# Patient Record
Sex: FEMALE | Race: BLACK OR AFRICAN AMERICAN | NOT HISPANIC OR LATINO | Employment: UNEMPLOYED | URBAN - METROPOLITAN AREA
[De-identification: names, ages, dates, MRNs, and addresses within clinical notes are randomized per-mention and may not be internally consistent; named-entity substitution may affect disease eponyms.]

---

## 2021-10-11 ENCOUNTER — HOSPITAL ENCOUNTER (EMERGENCY)
Facility: OTHER | Age: 26
Discharge: HOME | End: 2021-10-11
Attending: EMERGENCY MEDICINE
Payer: MEDICAID

## 2021-10-11 VITALS
HEIGHT: 60 IN | DIASTOLIC BLOOD PRESSURE: 83 MMHG | TEMPERATURE: 98 F | WEIGHT: 140 LBS | OXYGEN SATURATION: 98 % | SYSTOLIC BLOOD PRESSURE: 159 MMHG | HEART RATE: 79 BPM | BODY MASS INDEX: 27.48 KG/M2 | RESPIRATION RATE: 19 BRPM

## 2021-10-11 DIAGNOSIS — Z53.21 ELOPED FROM EMERGENCY DEPARTMENT: Primary | ICD-10-CM

## 2021-10-11 DIAGNOSIS — H57.12 LEFT EYE PAIN: ICD-10-CM

## 2021-10-11 PROCEDURE — 99282 EMERGENCY DEPT VISIT SF MDM: CPT | Mod: 25

## 2021-10-11 PROCEDURE — 25000003 PHARM REV CODE 250: Performed by: NURSE PRACTITIONER

## 2021-10-11 RX ORDER — ERYTHROMYCIN 5 MG/G
OINTMENT OPHTHALMIC
Status: COMPLETED | OUTPATIENT
Start: 2021-10-11 | End: 2021-10-11

## 2021-10-11 RX ORDER — TETRACAINE HYDROCHLORIDE 5 MG/ML
2 SOLUTION OPHTHALMIC
Status: COMPLETED | OUTPATIENT
Start: 2021-10-11 | End: 2021-10-11

## 2021-10-11 RX ADMIN — TETRACAINE HYDROCHLORIDE 2 DROP: 5 SOLUTION OPHTHALMIC at 11:10

## 2021-10-11 RX ADMIN — ERYTHROMYCIN 1 INCH: 5 OINTMENT OPHTHALMIC at 11:10

## 2021-10-11 RX ADMIN — FLUORESCEIN SODIUM 1 EACH: 1 STRIP OPHTHALMIC at 11:10

## 2022-06-21 PROBLEM — O09.93 SUPERVISION OF HIGH RISK PREGNANCY IN THIRD TRIMESTER: Status: ACTIVE | Noted: 2022-06-21

## 2022-06-21 PROBLEM — D57.3 SICKLE CELL TRAIT IN MOTHER AFFECTING PREGNANCY: Status: ACTIVE | Noted: 2022-06-21

## 2022-06-21 PROBLEM — Z98.891 HISTORY OF C-SECTION: Status: ACTIVE | Noted: 2022-06-21

## 2022-06-21 PROBLEM — O99.019 SICKLE CELL TRAIT IN MOTHER AFFECTING PREGNANCY: Status: ACTIVE | Noted: 2022-06-21

## 2022-06-21 PROBLEM — O09.33 INSUFFICIENT PRENATAL CARE IN THIRD TRIMESTER: Status: ACTIVE | Noted: 2022-06-21

## 2022-06-21 PROBLEM — O36.1930 MATERNAL ATYPICAL ANTIBODY COMPLICATING PREGNANCY IN THIRD TRIMESTER: Status: ACTIVE | Noted: 2022-06-21

## 2022-06-21 PROBLEM — O36.5930 POOR FETAL GROWTH AFFECTING MANAGEMENT OF MOTHER IN THIRD TRIMESTER: Status: ACTIVE | Noted: 2022-06-21

## 2022-06-22 ENCOUNTER — DOCUMENTATION ONLY (OUTPATIENT)
Dept: MATERNAL FETAL MEDICINE | Facility: CLINIC | Age: 27
End: 2022-06-22
Payer: MEDICAID

## 2022-06-22 ENCOUNTER — TELEPHONE (OUTPATIENT)
Dept: MATERNAL FETAL MEDICINE | Facility: CLINIC | Age: 27
End: 2022-06-22
Payer: MEDICAID

## 2022-06-22 PROBLEM — O28.3 ABNORMAL FETAL ULTRASOUND: Status: ACTIVE | Noted: 2022-06-22

## 2022-06-22 NOTE — TELEPHONE ENCOUNTER
Message left for pt to call [facility] clinic at [phone] to discuss appointments in [place].

## 2022-06-23 ENCOUNTER — TELEPHONE (OUTPATIENT)
Dept: MATERNAL FETAL MEDICINE | Facility: CLINIC | Age: 27
End: 2022-06-23
Payer: MEDICAID

## 2022-06-23 DIAGNOSIS — O36.5930 POOR FETAL GROWTH AFFECTING MANAGEMENT OF MOTHER IN THIRD TRIMESTER, SINGLE OR UNSPECIFIED FETUS: Primary | ICD-10-CM

## 2022-06-23 DIAGNOSIS — O35.9XX0 SUSPECTED FETAL ANOMALY, ANTEPARTUM, SINGLE OR UNSPECIFIED FETUS: ICD-10-CM

## 2022-06-23 PROBLEM — O35.BXX0: Status: ACTIVE | Noted: 2022-06-23

## 2022-06-23 NOTE — TELEPHONE ENCOUNTER
MFM RN discussed plan for upcoming appointments at [facility] on 6/28/22 starting at 9am for MFM and a fetal echo with consult to follow.     Patient to expect a call from our OB  and patient to call Medicaid transportation to arrange but explained that this needs to be scheduled at least 48 hours in advance.     Mentioned that there is a hotel right next to [facility] but patient will discuss with her family and call back with questions.     Pt verbalized understanding of information.

## 2022-06-24 ENCOUNTER — PATIENT MESSAGE (OUTPATIENT)
Dept: MATERNAL FETAL MEDICINE | Facility: CLINIC | Age: 27
End: 2022-06-24
Payer: MEDICAID

## 2022-06-24 ENCOUNTER — SOCIAL WORK (OUTPATIENT)
Dept: OBSTETRICS AND GYNECOLOGY | Facility: OTHER | Age: 27
End: 2022-06-24
Payer: MEDICAID

## 2022-06-24 NOTE — PROGRESS NOTES
SW contacted pt after consult received. SW role explained and pt verbalized understanding. According to pt, her father will pickup and drop off for scheduled . Pt explained she does not have anywhere to stay while in [place] and baby in NICU. SW educated pt on Medicaid lodging. Pt would like assistance with setting up lodging after the birth of baby.     SW contacted [email or web address] to arrange lodging. SW spoke with Mariusz to arrange lodging and meal reimbursement. ID # for request is [id number]. SW provided pt with id number and Medicaid number to check request. SW educated pt if transportation is needed, she would need to call Medicaid 48hrs prior to pickup time. SW name and number provided and SW encouraged pt to call for any needs or concerns.

## 2022-06-27 ENCOUNTER — OFFICE VISIT (OUTPATIENT)
Dept: MATERNAL FETAL MEDICINE | Facility: CLINIC | Age: 27
End: 2022-06-27
Payer: MEDICAID

## 2022-06-27 DIAGNOSIS — O28.3 ABNORMAL FETAL ULTRASOUND: ICD-10-CM

## 2022-06-27 DIAGNOSIS — O36.5930 POOR FETAL GROWTH AFFECTING MANAGEMENT OF MOTHER IN THIRD TRIMESTER, SINGLE OR UNSPECIFIED FETUS: ICD-10-CM

## 2022-06-27 DIAGNOSIS — O35.BXX0 HYPOPLASTIC LEFT HEART OF FETUS AFFECTING MANAGEMENT OF MOTHER IN SINGLETON PREGNANCY, ANTEPARTUM: ICD-10-CM

## 2022-06-27 PROCEDURE — 99499 NO LOS: ICD-10-PCS | Mod: 95,,, | Performed by: OBSTETRICS & GYNECOLOGY

## 2022-06-27 PROCEDURE — 99499 UNLISTED E&M SERVICE: CPT | Mod: 95,,, | Performed by: OBSTETRICS & GYNECOLOGY

## 2022-06-27 PROCEDURE — 96040 PR GENETIC COUNSELING, EACH 30 MIN: CPT | Mod: 95,,, | Performed by: OBSTETRICS & GYNECOLOGY

## 2022-06-27 PROCEDURE — 96040 PR GENETIC COUNSELING, EACH 30 MIN: ICD-10-PCS | Mod: 95,,, | Performed by: OBSTETRICS & GYNECOLOGY

## 2022-06-27 NOTE — PROGRESS NOTES
The patient location is: home  The chief complaint leading to consultation is: ultrasound anomalies    Visit type: audiovisual    Face to Face time with patient: 45 min  45 minutes of total time spent on the encounter, which includes face to face time and non-face to face time preparing to see the patient (eg, review of tests), Obtaining and/or reviewing separately obtained history, Documenting clinical information in the electronic or other health record, Independently interpreting results (not separately reported) and communicating results to the patient/family/caregiver, or Care coordination (not separately reported).         Each patient to whom he or she provides medical services by telemedicine is:  (1) informed of the relationship between the physician and patient and the respective role of any other health care provider with respect to management of the patient; and (2) notified that he or she may decline to receive medical services by telemedicine and may withdraw from such care at any time.    Notes:     Office Visit - Genetic Counseling Evaluation   Raji Luciano  : 1995  MRN: [id number]    DATE OF SERVICE: 22  TIME SPENT: 45 min    REFERRING PROVIDER: Dr. Zachery Luciano Jr.    REASON FOR CONSULT:  Raji Luciano, a 26 y.o. female with recent abnormal ultrasounds suggestive of hypoplastic left heart and growth restriction was referred for genetic counseling to discuss these findings and options for genetic testing. She came to the appointment alone.     HISTORY OF PRESENTING ILLNESS: At a recent visit ultrasounds noted hypoplastic left heart and growth restriction, Raji had lab work completed which included cfDNA screening which has not resulted as of this appointment.     OBSETRIC HISTORY: Raji is an 26 y.o.  female. Previous pregnancy history includes one full term  delivery.     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     FAMILY HISTORY:  Please see scanned pedigree in patient's chart under media.    Patient's ancestry is black. FOB ancestry is black. Consanguinity was denied.     COUNSELING:   Hypoplastic Left Heart Syndrome (HLHS)  Hypoplastic left heart syndrome occurs in approximately 2.6 in 10,000 pregnancies, however the live-birth rate is estimated to be 1.6/10,000 newborns, as affected fetuses are often stillborn or miscarry. HLHS accounts for approximately 2.8% of all congenital heart disease and 23% of  deaths from congenital heart malformations.     HLHS can be found in association with aneuploidy, including trisomy 13, trisomy 18, and Kirkpatrick syndrome. The literature estimates a 5-12% risk of aneuploidy for pregnancies affected with HLHS. HLHS is not commonly associated with trisomy 21, although there are a few case reports in the literature. Copy number variants (CNVs) detected by chromosome microarray analysis (CMA) have been reported with congenital heart defects in approximately 7% of case, though not specific to HLHS and in up to 16% of cases of HLHS specifically. Approximately 5-10% of individuals with a 11q deletion (Winifred) are affected with HLHS and it has also been reported in individuals with 22q11.21 deletion syndrome. A number of other more rare CNVs such as duplications of 1q21.1 and 8p23.1 have also been reported.     HLHS has been seen in conjunction with several single gene disorders including, but not limited to,  Rubstein-Taybi syndrome, Melina syndrome, Dawn Lemli Opitz and Diaz-Nighat syndrome. Several teratogens, including maternal diabetes, obsesity, opioid use, and anti-fungal medications have been associated with an increased risk for HLHS. In the absence of a chromosomal or syndromic cause, etiology appears to be multifactorial.     Amniocentesis was discussed for chromosome analysis and chromosome microarray analysis (CMA) as the diagnostic prenatal testing option for the indication of HLHS and growth restriction. The risks, benefits, and test performance characteristics of amniocentesis were reviewed. A risk of 1 in 900 for pregnancy loss post-amniocentesis was also reviewed.     Risks and benefits of testing were discussed today. Including the options for pregnancy continuation vs. termination. This was discussed briefly for the purposes of anticipatory guidance and were discussed non-directively.      A referral to a pediatric geneticist after birth is recommended if prenatal genetic diagnosis is not pursued, does not lead to a diagnosis, or if additional abnormalities, dysmorphic features, and/or developmental delays are noted at delivery. In cases of intrauterine or  demise, or in cases of pregnancy termination, an autopsy should be offered. If not previously performed, CMA on fetal or placental tissue should be considered. Additional genetic testing should be recommended as indicated by clinical findings and/or family history. If a genetic etiology is strongly suspected, DNA banking for the purposes of future genetic testing should be considered.     DISCUSSION & IMPRESSION:  Raji is a 26 y.o. female with fetal ultrasound indicating hypoplastic left heart and growth restriction. No genetic testing or screening complete at the time of this visit.     Raji stated that she is concerned about the baby, she has lots of questions for the doctors about next steps and delivery. She would like to know what  is causing these problems but is concerned about the risks of miscarriage, she stated that she would complete an amniocentesis as a last resort.      We reviewed Raji's medical and family history. We discussed basics of genetics and HLHS. Raji was understanding of the information discussed in clinic and all questions were answered. Poppy declines genetic testing.    Please see Dr. Kauffman's upcoming note for 6/28/2022 for detailed information regarding ultrasound findings, plan of care and diagnostic considerations.    RECOMMENDATIONS:  1. If amniocentesis is elected, FISH with reflex to chromosomes if abnormal and microarray if normal to be sent to [facility]. Further discussion of microarray should be completed prior to ordering for informed consent    Crista Bateman MS, CGC  Licensed, Certified Genetic Counselor  [facility]

## 2022-06-28 ENCOUNTER — PROCEDURE VISIT (OUTPATIENT)
Dept: MATERNAL FETAL MEDICINE | Facility: CLINIC | Age: 27
End: 2022-06-28
Payer: MEDICAID

## 2022-06-28 ENCOUNTER — CLINICAL SUPPORT (OUTPATIENT)
Dept: PEDIATRIC CARDIOLOGY | Facility: CLINIC | Age: 27
End: 2022-06-28
Payer: MEDICAID

## 2022-06-28 ENCOUNTER — OFFICE VISIT (OUTPATIENT)
Dept: MATERNAL FETAL MEDICINE | Facility: CLINIC | Age: 27
End: 2022-06-28
Payer: MEDICAID

## 2022-06-28 ENCOUNTER — OFFICE VISIT (OUTPATIENT)
Dept: PEDIATRIC CARDIOLOGY | Facility: CLINIC | Age: 27
End: 2022-06-28
Payer: MEDICAID

## 2022-06-28 VITALS
DIASTOLIC BLOOD PRESSURE: 62 MMHG | WEIGHT: 156.94 LBS | SYSTOLIC BLOOD PRESSURE: 113 MMHG | HEIGHT: 60 IN | BODY MASS INDEX: 30.81 KG/M2

## 2022-06-28 VITALS
WEIGHT: 156.94 LBS | SYSTOLIC BLOOD PRESSURE: 113 MMHG | DIASTOLIC BLOOD PRESSURE: 62 MMHG | BODY MASS INDEX: 30.66 KG/M2

## 2022-06-28 DIAGNOSIS — O36.5930 POOR FETAL GROWTH AFFECTING MANAGEMENT OF MOTHER IN THIRD TRIMESTER, SINGLE OR UNSPECIFIED FETUS: ICD-10-CM

## 2022-06-28 DIAGNOSIS — O35.9XX0 SUSPECTED FETAL ANOMALY, ANTEPARTUM, SINGLE OR UNSPECIFIED FETUS: ICD-10-CM

## 2022-06-28 DIAGNOSIS — O35.BXX0 ANOMALY OF HEART OF FETUS AFFECTING PREGNANCY, ANTEPARTUM, SINGLE OR UNSPECIFIED FETUS: ICD-10-CM

## 2022-06-28 DIAGNOSIS — O35.BXX0 ANOMALY OF HEART OF FETUS AFFECTING PREGNANCY, ANTEPARTUM, SINGLE OR UNSPECIFIED FETUS: Primary | ICD-10-CM

## 2022-06-28 DIAGNOSIS — O28.5 MATERNAL SERUM SCREEN POSITIVE FOR TRISOMY 18: ICD-10-CM

## 2022-06-28 PROCEDURE — 3074F PR MOST RECENT SYSTOLIC BLOOD PRESSURE < 130 MM HG: ICD-10-PCS | Mod: CPTII,,, | Performed by: PEDIATRICS

## 2022-06-28 PROCEDURE — 76827 PR  SO2 FETAL HEART DOPPLER: ICD-10-PCS | Mod: 26,S$PBB,, | Performed by: PEDIATRICS

## 2022-06-28 PROCEDURE — 3074F SYST BP LT 130 MM HG: CPT | Mod: CPTII,,, | Performed by: OBSTETRICS & GYNECOLOGY

## 2022-06-28 PROCEDURE — 76825 PR  SO2 FETAL HEART: ICD-10-PCS | Mod: 26,S$PBB,, | Performed by: PEDIATRICS

## 2022-06-28 PROCEDURE — 99204 OFFICE O/P NEW MOD 45 MIN: CPT | Mod: 25,S$PBB,, | Performed by: PEDIATRICS

## 2022-06-28 PROCEDURE — 1111F PR DISCHARGE MEDS RECONCILED W/ CURRENT OUTPATIENT MED LIST: ICD-10-PCS | Mod: CPTII,,, | Performed by: OBSTETRICS & GYNECOLOGY

## 2022-06-28 PROCEDURE — 3078F PR MOST RECENT DIASTOLIC BLOOD PRESSURE < 80 MM HG: ICD-10-PCS | Mod: CPTII,,, | Performed by: PEDIATRICS

## 2022-06-28 PROCEDURE — 76811 OB US DETAILED SNGL FETUS: CPT | Mod: 26,S$PBB,, | Performed by: OBSTETRICS & GYNECOLOGY

## 2022-06-28 PROCEDURE — 3008F BODY MASS INDEX DOCD: CPT | Mod: CPTII,,, | Performed by: OBSTETRICS & GYNECOLOGY

## 2022-06-28 PROCEDURE — 76820 UMBILICAL ARTERY ECHO: CPT | Mod: 26,S$PBB,, | Performed by: OBSTETRICS & GYNECOLOGY

## 2022-06-28 PROCEDURE — 3008F BODY MASS INDEX DOCD: CPT | Mod: CPTII,,, | Performed by: PEDIATRICS

## 2022-06-28 PROCEDURE — 76820 UMBILICAL ARTERY ECHO: CPT | Mod: PBBFAC,59 | Performed by: OBSTETRICS & GYNECOLOGY

## 2022-06-28 PROCEDURE — 76819 FETAL BIOPHYS PROFIL W/O NST: CPT | Mod: PBBFAC | Performed by: OBSTETRICS & GYNECOLOGY

## 2022-06-28 PROCEDURE — 93325 PR DOPPLER COLOR FLOW VELOCITY MAP: ICD-10-PCS | Mod: 26,S$PBB,, | Performed by: PEDIATRICS

## 2022-06-28 PROCEDURE — 76825 ECHO EXAM OF FETAL HEART: CPT | Mod: PBBFAC | Performed by: PEDIATRICS

## 2022-06-28 PROCEDURE — 99212 OFFICE O/P EST SF 10 MIN: CPT | Mod: PBBFAC,25,27 | Performed by: PEDIATRICS

## 2022-06-28 PROCEDURE — 76825 ECHO EXAM OF FETAL HEART: CPT | Mod: 26,S$PBB,, | Performed by: PEDIATRICS

## 2022-06-28 PROCEDURE — 3008F PR BODY MASS INDEX (BMI) DOCUMENTED: ICD-10-PCS | Mod: CPTII,,, | Performed by: PEDIATRICS

## 2022-06-28 PROCEDURE — 76819 FETAL BIOPHYS PROFIL W/O NST: CPT | Mod: 26,S$PBB,, | Performed by: OBSTETRICS & GYNECOLOGY

## 2022-06-28 PROCEDURE — 1111F PR DISCHARGE MEDS RECONCILED W/ CURRENT OUTPATIENT MED LIST: ICD-10-PCS | Mod: CPTII,,, | Performed by: PEDIATRICS

## 2022-06-28 PROCEDURE — 3008F PR BODY MASS INDEX (BMI) DOCUMENTED: ICD-10-PCS | Mod: CPTII,,, | Performed by: OBSTETRICS & GYNECOLOGY

## 2022-06-28 PROCEDURE — 1159F PR MEDICATION LIST DOCUMENTED IN MEDICAL RECORD: ICD-10-PCS | Mod: CPTII,,, | Performed by: OBSTETRICS & GYNECOLOGY

## 2022-06-28 PROCEDURE — 99205 PR OFFICE/OUTPT VISIT, NEW, LEVL V, 60-74 MIN: ICD-10-PCS | Mod: 25,S$PBB,TH, | Performed by: OBSTETRICS & GYNECOLOGY

## 2022-06-28 PROCEDURE — 76819 PR US, OB, FETAL BIOPHYSICAL, W/O NST: ICD-10-PCS | Mod: 26,S$PBB,, | Performed by: OBSTETRICS & GYNECOLOGY

## 2022-06-28 PROCEDURE — 99999 PR PBB SHADOW E&M-EST. PATIENT-LVL II: CPT | Mod: PBBFAC,,, | Performed by: PEDIATRICS

## 2022-06-28 PROCEDURE — 3078F PR MOST RECENT DIASTOLIC BLOOD PRESSURE < 80 MM HG: ICD-10-PCS | Mod: CPTII,,, | Performed by: OBSTETRICS & GYNECOLOGY

## 2022-06-28 PROCEDURE — 99212 OFFICE O/P EST SF 10 MIN: CPT | Mod: PBBFAC,TH | Performed by: OBSTETRICS & GYNECOLOGY

## 2022-06-28 PROCEDURE — 76811 OB US DETAILED SNGL FETUS: CPT | Mod: PBBFAC | Performed by: OBSTETRICS & GYNECOLOGY

## 2022-06-28 PROCEDURE — 76827 ECHO EXAM OF FETAL HEART: CPT | Mod: 26,S$PBB,, | Performed by: PEDIATRICS

## 2022-06-28 PROCEDURE — 1159F MED LIST DOCD IN RCRD: CPT | Mod: CPTII,,, | Performed by: OBSTETRICS & GYNECOLOGY

## 2022-06-28 PROCEDURE — 76827 ECHO EXAM OF FETAL HEART: CPT | Mod: PBBFAC | Performed by: PEDIATRICS

## 2022-06-28 PROCEDURE — 93325 DOPPLER ECHO COLOR FLOW MAPG: CPT | Mod: PBBFAC | Performed by: PEDIATRICS

## 2022-06-28 PROCEDURE — 99999 PR PBB SHADOW E&M-EST. PATIENT-LVL II: ICD-10-PCS | Mod: PBBFAC,,, | Performed by: OBSTETRICS & GYNECOLOGY

## 2022-06-28 PROCEDURE — 93325 DOPPLER ECHO COLOR FLOW MAPG: CPT | Mod: 26,S$PBB,, | Performed by: PEDIATRICS

## 2022-06-28 PROCEDURE — 3078F DIAST BP <80 MM HG: CPT | Mod: CPTII,,, | Performed by: PEDIATRICS

## 2022-06-28 PROCEDURE — 3074F SYST BP LT 130 MM HG: CPT | Mod: CPTII,,, | Performed by: PEDIATRICS

## 2022-06-28 PROCEDURE — 1111F DSCHRG MED/CURRENT MED MERGE: CPT | Mod: CPTII,,, | Performed by: OBSTETRICS & GYNECOLOGY

## 2022-06-28 PROCEDURE — 99205 OFFICE O/P NEW HI 60 MIN: CPT | Mod: 25,S$PBB,TH, | Performed by: OBSTETRICS & GYNECOLOGY

## 2022-06-28 PROCEDURE — 76820 PR US, OB DOPPLER, FETAL UMBILICAL ARTERY ECHO: ICD-10-PCS | Mod: 26,S$PBB,, | Performed by: OBSTETRICS & GYNECOLOGY

## 2022-06-28 PROCEDURE — 3074F PR MOST RECENT SYSTOLIC BLOOD PRESSURE < 130 MM HG: ICD-10-PCS | Mod: CPTII,,, | Performed by: OBSTETRICS & GYNECOLOGY

## 2022-06-28 PROCEDURE — 3078F DIAST BP <80 MM HG: CPT | Mod: CPTII,,, | Performed by: OBSTETRICS & GYNECOLOGY

## 2022-06-28 PROCEDURE — 1111F DSCHRG MED/CURRENT MED MERGE: CPT | Mod: CPTII,,, | Performed by: PEDIATRICS

## 2022-06-28 PROCEDURE — 99204 PR OFFICE/OUTPT VISIT, NEW, LEVL IV, 45-59 MIN: ICD-10-PCS | Mod: 25,S$PBB,, | Performed by: PEDIATRICS

## 2022-06-28 PROCEDURE — 99999 PR PBB SHADOW E&M-EST. PATIENT-LVL II: ICD-10-PCS | Mod: PBBFAC,,, | Performed by: PEDIATRICS

## 2022-06-28 PROCEDURE — 76811 PR US, OB FETAL EVAL & EXAM, TRANSABDOM,FIRST GESTATION: ICD-10-PCS | Mod: 26,S$PBB,, | Performed by: OBSTETRICS & GYNECOLOGY

## 2022-06-28 PROCEDURE — 99999 PR PBB SHADOW E&M-EST. PATIENT-LVL II: CPT | Mod: PBBFAC,,, | Performed by: OBSTETRICS & GYNECOLOGY

## 2022-06-28 NOTE — ASSESSMENT & PLAN NOTE
Today we had the pleasure of meeting with MsClemente Luciano. She has been referred to us by Dr. Luciano in [place] due to suspected hypoplastic left heart.  On today's ultrasound, ...  Her cell free DNA has not resulted and was drawn... She has met with our Genetic counselor to discuss genetic causes associated with hypoplastic left heart.  We revisited the limitations of genetic screening with cell free DNA. We also reviewed the additional information diagnostic testing via amniocentesis could potentially provide. We discussed the 1 in 900 risk of procedure related pregnancy loss.  I also explained to her that the information provided via amniocentesis would be for her knowledge and is not required for our team to assist in the care of her and her fetus.    We discussed that his   She has a consultation with our Pediatric Cardiology team today and they will assist in management and delivery plantessien

## 2022-06-28 NOTE — PROGRESS NOTES
Maternal Fetal Medicine Consult    SUBJECTIVE:     Kathy Luciano is a 26 y.o.  female with IUP at 29w6d who is seen in consultation by [facility] for evaluation and management of:    Problem   Maternal Serum Screen Positive for Trisomy 18   Hypoplastic Left Heart of Fetus Affecting Management of Mother in [place] Pregnancy, Antepartum (suspected, based on outside scan)       Obstetric History:  G1: PCD in 2019    Past Medical History:   Diagnosis Date    Abnormal Pap smear of cervix     Anemia     Mental disorder     depression    Postpartum depression      Past Surgical History:   Procedure Laterality Date     SECTION       Family history: negative for birth defects, recurrent miscarriages, chromosomal abnormalities.   Social History     Tobacco Use    Smoking status: Light Tobacco Smoker     Packs/day: 0.25     Years: 5.00     Pack years: 1.25    Smokeless tobacco: Never Used   Substance Use Topics    Alcohol use: Not Currently    Drug use: Not Currently     Review of patient's allergies indicates:  No Known Allergies  Medications:    Aneuploidy screening:   Cell free DNA +T18    Records reviewed    Care team members:  Katie Castillo MD - Primary OB  Zachery Luciano- [facility]     OBJECTIVE:     Blood Pressure: /62 (BP Location: Left arm, Patient Position: Sitting, BP Method: Large (Automatic))   Ht 5' (1.524 m)   Wt 71.2 kg (156 lb 15.5 oz)   BMI 30.66 kg/m²       Physical Exam:  General: WDWN,NAD    Ultrasound performed. See viewpoint for full ultrasound report.      ASSESSMENT/PLAN:     26 y.o.  female with IUP at 29w6d presents for [facility] consultation.      Maternal serum screen positive for trisomy 18  Today we met with Ms. Luciano. She has been referred to us by Dr. Luciano in [place] due to complex cardiac malformation and severe FGR.  During the visit, her cell free DNA returned as positive for Trisomy 18.  She was informed of these findings and findings on ultrasound were also  reviewed.   On today's ultrasound, we confirmed severe FGR with the fetus measuring at the 4% with AC<1%. Normal UA Dopplers. Both hands appear to be clenched and there is an absent nasal bone. The fetus also appears to have a complex cardiac defect. She underwent a fetal echocardiogram with our Pediatric Cardiology colleagues (please refer to their note for details).    We discussed that given the constellation of findings today, it is likely that the fetus does have Trisomy 18; however, cell free DNA screening is not diagnostic. She had met with our Genetic counselor prior to this visit. We revisited the limitations of genetic screening with cell free DNA. We also reviewed the additional information diagnostic testing via amniocentesis could potentially provide. We discussed the 1 in 900 risk of procedure related pregnancy loss and the risk for PTD given current EGA.  I also explained to her that the information provided via amniocentesis would be of benefit to her to confirm the diagnosis, assist in management of this pregnancy and in future pregnancy planning.     We discussed that trisomy 18 is considered a lethal anomaly, and no major interventions including cardiac surgery are offered to a fetus with trisomy 18.  The majority of fetuses with Trisomy 18 die within the first week of life. While some may survive up until age 1, given this fetus's complex cardiac defect and our inability to offer major intervention, the child will likely die shortly after birth. Trisomy 18 may involve essentially any organ system and common features included fetal growth restriction, clenched hands, and heart defects as we see today.    Many fetuses with Trisomy 18 die in utero. We discussed maternal monitoring of fetal movement and close follow up with her primary OB.    We discussed these findings with the patient's father via cell phone as well.   Understandably, this information was overwhelming. At the end of the  conversation, she strongly desired to return home however did express interest in an amniocentesis at a later date. If she pursues amniocentesis, this will be done with Dr. Luciano in [place].  Given that  cardiac surgical intervention will not be offered, delivery in [place] at [facility] is not necessary. Anticipate delivery in either [place] with the patient's primary OB or in [place] with Dr. Luciano.     Her primary OB, Dr. Castillo and Dr. Luciano  were notified of our conversation.    Recommendations  1. Follow up with primary OB on a weekly basis to continue to verify FHTs  2. Amniocentesis with Dr. Luciano      No further visits with our MFM team were scheduled however please feel free to contact us with any questions or concerns.        60 minutes of total time spent on the encounter, which includes face to face time and non-face to face time preparing to see the patient (eg, review of tests), obtaining and/or reviewing separately obtained history, documenting clinical information in the electronic or other health record, independently interpreting results (not separately reported) and communicating results to the patient/family/caregiver, or care coordination (not separately reported).      Chaparro Varghese MD  Maternal Fetal Medicine   PGY-6  [facility]

## 2022-06-28 NOTE — ASSESSMENT & PLAN NOTE
Today we met with Ms. Luciano. She has been referred to us by Dr. Luciano in [place] due to complex cardiac malformation and severe FGR.  During the visit, her cell free DNA returned as positive for Trisomy 18.  She was informed of these findings and findings on ultrasound were also reviewed.   On today's ultrasound, we confirmed severe FGR with the fetus measuring at the 4% with AC<1%. Normal UA Dopplers. Both hands appear to be clenched and there is an absent nasal bone. The fetus also appears to have a complex cardiac defect. She underwent a fetal echocardiogram with our Pediatric Cardiology colleagues(please refer to their note for details).    We discussed that given the constellation of findings today, it is likely that the fetus does have Trisomy 18 however cell free DNA screening is not diagnostic. She had met with our Genetic counselor prior to this visit;we revisited the limitations of genetic screening with cell free DNA. We also reviewed the additional information diagnostic testing via amniocentesis could potentially provide. We discussed the 1 in 900 risk of procedure related pregnancy loss.  I also explained to her that the information provided via amniocentesis would be of benefit to her to confirm the diagnosis, assist in management of this pregnancy and in future pregnancy planning.     We discussed that trisomy 18 is considered a lethal anomaly and no major interventions including cardiac surgery are offered to a fetus with trisomy 18.  The majority of fetuses with Trisomy 18 die within the first week of life. While some may survive up until age 1, given this fetus's complex cardiac defect and our inability to offer major intervention, 12 month survival is extremely unlikely. Trisomy 18 may involve essentially any organ system and common features included fetal growth restriction, clenched hands, and heart defects as we see today.    Many fetuses with Trisomy 18 die in utero. We discussed fetal  movement and close follow up with her primary OB.    We discussed these findings with the patient's father via cell phone as well.   Understandably, this information was overwhelming.At the end of the conversation, she strongly desired to return home however did express interest in an amniocentesis at a later date.    Her primary OB, Dr. Castillo and Dr. Luciano  were notified of our conversation.    Recommendations  1. Follow up with primary OB on a weekly basis to continue to verify FHTs  2. Amniocentesis with Dr. Luciano      No further visits with our MFM team were scheduled however please feel free to contact us with any questions or concerns.

## 2022-06-30 NOTE — PROGRESS NOTES
[facility] Fetal Medicine ([facility] Fetal Cardiology Clinic    Today, I had the pleasure of evaluating Raji Luciano who is now 26 y.o. and carrying her second pregnancy at 29 6/7 weeks gestation with an ELIN of 22. She was referred for evaluation of the fetal heart due to a concern for fetal congenital heart disease.  The fetus has physical marker of a trisomy and a cell free DNA positive for trisomy 18.    She is carrying a female fetus, yet unnamed.      Obstetric History:    .  Her OB care is by Dr. Key.  Her MFM care is by Dr. Kauffman.     Past Medical History:   Diagnosis Date    Abnormal Pap smear of cervix     Anemia     Mental disorder     depression    Postpartum depression        No current outpatient medications on file.    Family History: Negative for congenital heart disease, early coronary artery disease, sudden unexplained death, connective tissues disorders, genetic syndromes, multiple miscarriages or other congenital anomalies.    Social History: Ms. Luciano is single.      FETAL ECHOCARDIOGRAM (summary):  Fetal echocardiogram at 29 6/7 weeks gestation for a concern for fetal congenital heart disease. ELIN 22. 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 Severely hypoplastic left atrium and ventricle. Normal sinus rhythm. No ectopy or sustained arrhythmia demonstrated throughout the study. No ventricular level shunting. Mildly dilated tricuspid valve with mild regurgitation. No outflow tract obstruction. Normal appearing aortic arch. Mildly dilated right ventricle with normal systolic function. No pericardial effusion.   (Full report in electronic medical record)    Impression:  Single active female fetus at 29 wga.  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  I discussed my findings with Ms. Luciano and informed her that in cases of patients with trisomy 18, no one in the [place]  offers surgical palliation for single ventricle heart disease.       I discussed with her that fetal echocardiography is insufficiently sensitive to rule out all septal defects, anomalies of pulmonary and systemic veins, arch anomalies, and some valvar abnormalities, nor can it ensure that the ductus arteriosus and foramen ovale will spontaneously close.     Recommendations:  Recommend follow up with palliative care medicine and MFM.    The above information was discussed in detail including the use of diagrams, with 45 minutes of total face to face time, with greater than 50% with counseling and coordination of care.  The discussion of the diagnosis and treatment options is as described above.      Luis Lopez MD, MPH  Pediatric and Fetal Cardiology  [facility]   [address] [place] [address]    Office: [phone]  Cell: [phone]

## 2022-07-06 PROBLEM — Z36.0 ENCOUNTER FOR ANTENATAL SCREENING FOR CHROMOSOMAL ANOMALIES BY AMNIOCENTESIS: Status: ACTIVE | Noted: 2022-07-06

## 2022-08-02 PROBLEM — O35.12X0 TRISOMY 18 OF FETUS IN CURRENT PREGNANCY: Status: ACTIVE | Noted: 2022-08-02

## 2022-08-02 PROBLEM — Z36.0 ENCOUNTER FOR ANTENATAL SCREENING FOR CHROMOSOMAL ANOMALIES BY AMNIOCENTESIS: Status: RESOLVED | Noted: 2022-07-06 | Resolved: 2022-08-02

## 2022-09-12 PROBLEM — O48.0 POST-TERM PREGNANCY, 40-42 WEEKS OF GESTATION: Status: ACTIVE | Noted: 2022-09-12

## 2022-09-19 PROBLEM — Z3A.41 41 WEEKS GESTATION OF PREGNANCY: Status: ACTIVE | Noted: 2022-09-19

## 2022-09-19 PROBLEM — O48.0 41 WEEKS GESTATION OF PREGNANCY: Status: ACTIVE | Noted: 2022-09-19

## 2022-09-19 PROBLEM — O09.30 INSUFFICIENT ANTEPARTUM CARE: Status: ACTIVE | Noted: 2022-06-21

## 2022-10-10 PROBLEM — O36.5930 POOR FETAL GROWTH AFFECTING MANAGEMENT OF MOTHER IN THIRD TRIMESTER: Status: RESOLVED | Noted: 2022-06-21 | Resolved: 2022-10-10

## 2022-12-19 PROBLEM — Z3A.41 41 WEEKS GESTATION OF PREGNANCY: Status: RESOLVED | Noted: 2022-09-19 | Resolved: 2022-12-19

## 2022-12-19 PROBLEM — O48.0 41 WEEKS GESTATION OF PREGNANCY: Status: RESOLVED | Noted: 2022-09-19 | Resolved: 2022-12-19

## 2022-12-19 PROBLEM — O48.0 POST-TERM PREGNANCY, 40-42 WEEKS OF GESTATION: Status: RESOLVED | Noted: 2022-09-12 | Resolved: 2022-12-19
